# Patient Record
(demographics unavailable — no encounter records)

---

## 2025-06-27 NOTE — REASON FOR VISIT
[Initial Evaluation] : an initial evaluation [Patient] : patient [Father] : father [FreeTextEntry1] : left leg injury

## 2025-06-27 NOTE — ASSESSMENT
[FreeTextEntry1] : CLIVE is 15 year old male with Left ankle sprain. Date of injury: 06/25/2025   The condition, natural history, and prognosis were explained to the family. Today's visit included obtaining the history from the child and parent, due to the child's age, the child could not be considered a reliable historian, requiring the parent to act as an independent historian. The clinical findings and images were reviewed with the family.   XR of Left Ankle done in office (Under the name Sotero Nettles) on 06/26/2025 and were viewed and independently interpreted: The patient is skeletally mature. No fracture, or dislocation noted. No bony abnormalities noted. No soft tissue findings.  There are no obvious sings of fracture or dislocation on XRs, however he has some tenderness on exam. His exam is consistent with a mild ATFL ankle sprain. The recommendation is ice and NSAIDs for pain. He will remain out of gym, sports for 2 weeks. Follow up recommended in my office in 2 weeks for clinical reassessment.    All questions and concerns were addressed today. Family verbalizes understanding and agree with plan of care.   I, Yfn Loza, have acted as a scribe and documented the above information for Dr. Hair on 06/26/2025

## 2025-06-27 NOTE — PHYSICAL EXAM
[FreeTextEntry1] : Gait: Presents ambulating independently without signs of antalgia.  Good coordination and balance noted. Plantigrade foot with heel-to-toe progression. Neutral foot progression angle. GENERAL: Healthy appearing 15 year-old child. Alert, cooperative, in NAD SKIN: The skin is intact, warm, pink and dry over the area examined. EYES: Normal conjunctiva, normal eyelids and pupils were equal and round.  ENT: normal ears, normal nose and normal lips.  CARDIOVASCULAR: brisk capillary refill, but no peripheral edema.  RESPIRATORY: The patient is in no apparent respiratory distress. They're taking full deep breaths without use of accessory muscles or evidence of audible wheezes or stridor without the use of a stethoscope. Normal respiratory effort.  ABDOMEN: not examined  MUSCULOSKELETAL:  Left Ankle Skin intact No ecchymosis or bruising No soft tissue swelling + TTP over ATFL No TTP over prox fibula, distal fibula No pain with compression of the syndesmosis No TTP over medial malleolus or anterior tibia Negative anterior drawer with the foot plantarflexed and dorsiflexed Negative increased inversion > 15 compared to the contralateral side

## 2025-06-27 NOTE — END OF VISIT
[FreeTextEntry3] : I, Taylor Hair MD, personally saw and evaluated the patient and developed the plan as documented above. I concur or have edited the note as appropriate.

## 2025-06-27 NOTE — HISTORY OF PRESENT ILLNESS
[FreeTextEntry1] : CLIVE is a 15-year-old M who presents for evaluation for left ankle injury.  The patient first sustained injury yesterday on 06/25/2025 when he fell from his scooter. He fell on the left side of his body and sustained injury to his left ankle. The patient then went to . Xray was done and ankle sprain was diagnosed. He was placed in a provisional splint. They were instructed to follow with peds ortho. he is doing better with pain, using Motrin. Denies any radiating pain, tingling, numbness in his toes.  Date of injury: 06/25/2025

## 2025-06-27 NOTE — DATA REVIEWED
[de-identified] :  XR of Left Ankle done in office (Under the name Sotero Nettles) on 06/26/2025 and were viewed and independently interpreted: The patient is skeletally mature. No fracture, or dislocation noted. No bony abnormalities noted. No soft tissue findings.

## 2025-06-27 NOTE — ASSESSMENT
[FreeTextEntry1] : CLIVE is 15 year old male with Left ankle sprain. Date of injury: 06/25/2025   The condition, natural history, and prognosis were explained to the family. Today's visit included obtaining the history from the child and parent, due to the child's age, the child could not be considered a reliable historian, requiring the parent to act as an independent historian. The clinical findings and images were reviewed with the family.   XR of Left Ankle done in office (Under the name Sotero Ntetles) on 06/26/2025 and were viewed and independently interpreted: The patient is skeletally mature. No fracture, or dislocation noted. No bony abnormalities noted. No soft tissue findings.  There are no obvious sings of fracture or dislocation on XRs, however he has some tenderness on exam. His exam is consistent with a mild ATFL ankle sprain. The recommendation is ice and NSAIDs for pain. He will remain out of gym, sports for 2 weeks. Follow up recommended in my office in 2 weeks for clinical reassessment.    All questions and concerns were addressed today. Family verbalizes understanding and agree with plan of care.   I, Yfn Loza, have acted as a scribe and documented the above information for Dr. Hair on 06/26/2025

## 2025-06-27 NOTE — DATA REVIEWED
[de-identified] :  XR of Left Ankle done in office (Under the name Sotero Nettles) on 06/26/2025 and were viewed and independently interpreted: The patient is skeletally mature. No fracture, or dislocation noted. No bony abnormalities noted. No soft tissue findings.